# Patient Record
Sex: FEMALE | Race: WHITE | HISPANIC OR LATINO | Employment: PART TIME | URBAN - METROPOLITAN AREA
[De-identification: names, ages, dates, MRNs, and addresses within clinical notes are randomized per-mention and may not be internally consistent; named-entity substitution may affect disease eponyms.]

---
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
## 2019-05-01 LAB
B-HCG UR QL: NEGATIVE
BILIRUB UR QL STRIP: NEGATIVE
CLARITY UR: CLEAR
COLOR UR: ABNORMAL
GLUCOSE UR STRIP-MCNC: NEGATIVE MG/DL
HGB UR QL STRIP.AUTO: NEGATIVE
KETONES UR QL STRIP: ABNORMAL
LEUKOCYTE ESTERASE UR QL STRIP.AUTO: NEGATIVE
NITRITE UR QL STRIP: NEGATIVE
PH UR STRIP.AUTO: 5.5 [PH] (ref 5–8)
PROT UR QL STRIP: NEGATIVE
SP GR UR STRIP: >=1.03 (ref 1–1.03)
UROBILINOGEN UR QL STRIP: ABNORMAL

## 2019-05-01 PROCEDURE — 99284 EMERGENCY DEPT VISIT MOD MDM: CPT

## 2019-05-01 PROCEDURE — 81003 URINALYSIS AUTO W/O SCOPE: CPT

## 2019-05-01 PROCEDURE — 81025 URINE PREGNANCY TEST: CPT

## 2019-05-02 ENCOUNTER — HOSPITAL ENCOUNTER (EMERGENCY)
Facility: HOSPITAL | Age: 19
Discharge: HOME | End: 2019-05-02
Attending: EMERGENCY MEDICINE | Admitting: EMERGENCY MEDICINE

## 2019-05-02 VITALS
DIASTOLIC BLOOD PRESSURE: 78 MMHG | TEMPERATURE: 97.6 F | BODY MASS INDEX: 29.02 KG/M2 | SYSTOLIC BLOOD PRESSURE: 126 MMHG | WEIGHT: 170 LBS | OXYGEN SATURATION: 98 % | HEIGHT: 64 IN | RESPIRATION RATE: 15 BRPM | HEART RATE: 84 BPM

## 2019-05-02 DIAGNOSIS — N89.8 ITCHING IN THE VAGINAL AREA: Primary | ICD-10-CM

## 2019-05-02 LAB
CLUE CELLS SPEC QL WET PREP: NORMAL
HYDATID CYST SPEC WET PREP: NORMAL
KOH PREP NAIL: NORMAL
T VAGINALIS SPEC QL WET PREP: NORMAL
WBC SPEC QL WET PREP: NORMAL
YEAST GENITAL QL WET PREP: NORMAL

## 2019-05-02 PROCEDURE — 87220 TISSUE EXAM FOR FUNGI: CPT | Performed by: EMERGENCY MEDICINE

## 2019-05-02 PROCEDURE — 87591 N.GONORRHOEAE DNA AMP PROB: CPT | Performed by: EMERGENCY MEDICINE

## 2019-05-02 PROCEDURE — 87491 CHLMYD TRACH DNA AMP PROBE: CPT | Performed by: EMERGENCY MEDICINE

## 2019-05-02 PROCEDURE — 87210 SMEAR WET MOUNT SALINE/INK: CPT | Performed by: EMERGENCY MEDICINE

## 2019-05-02 NOTE — DISCHARGE INSTRUCTIONS
You are advised to follow closely with Dr Lewis in 2-3 days for recheck, pelvic culture results, final results of lab work testing, and further testing/treatment as needed.    Drink plenty of fluids      Please return to the emergency department immediately with chest pain different than usual for you, shortness of air, abdominal pain, persistent vomiting/fever, blood in emesis or stool, lightheadedness/fainting, problems with speech, one sided weakness/numbness, new incontinence, problems with vision,or for worsening of symptoms or other concerns.

## 2019-05-02 NOTE — ED NOTES
Pt reports burning on urination with more frequent urination.  Denies abdominal pain.     Leslie Gill, RN  05/01/19 2905

## 2019-05-02 NOTE — ED PROVIDER NOTES
EMERGENCY DEPARTMENT ENCOUNTER    CHIEF COMPLAINT  Chief Complaint: dysuria  History given by: pt  History limited by: none  Room Number: 04/04  PMD: Provider, No Known   Gynecology: Dr. Lewis, [facility]     HPI:  Pt is a 18 y.o. female who presents complaining of dysuria. Pt confirms vaginal itching and sore throat. Pt denies vaginal discharge. Pt's LMP was April 15. Pt is not breastfeeding. Pt had chlamydia and was treated May 2017. Pt denies CP, SOA, cough, N/V, and other complaints at this time.    Duration:  PTA  Onset: sudden  Timing: constant  Location: vaginal  Radiation: none  Quality: burning  Intensity/Severity: moderate  Progression: unchanged  Associated Symptoms: cough  Aggravating Factors: none  Alleviating Factors: none  Previous Episodes: none  Treatment before arrival: none    PAST MEDICAL HISTORY  Active Ambulatory Problems     Diagnosis Date Noted   • Normal pregnancy 03/13/2018   • Mild intermittent asthma 03/13/2018   • Cocaine use complicating pregnancy 03/13/2018   • Iron deficiency anemia 07/18/2018   • Normal labor 10/16/2018   • Normal delivery at term 10/17/2018     Resolved Ambulatory Problems     Diagnosis Date Noted   • Pregnancy 08/02/2018     Past Medical History:   Diagnosis Date   • Asthma    • Chlamydia    • Urinary tract infection    • Yeast infection        PAST SURGICAL HISTORY  History reviewed. No pertinent surgical history.    FAMILY HISTORY  Family History   Problem Relation Age of Onset   • Breast cancer Neg Hx    • Ovarian cancer Neg Hx    • Uterine cancer Neg Hx    • Colon cancer Neg Hx        SOCIAL HISTORY  Social History     Socioeconomic History   • Marital status: Single     Spouse name: Not on file   • Number of children: Not on file   • Years of education: Not on file   • Highest education level: Not on file   Tobacco Use   • Smoking status: Former Smoker   • Smokeless tobacco: Never Used   Substance and Sexual Activity   • Alcohol use: Yes      Comment: Occasionally   • Drug use: No   • Sexual activity: Yes     Partners: Male     Birth control/protection: None       ALLERGIES  Patient has no known allergies.    REVIEW OF SYSTEMS  Review of Systems   Constitutional: Negative for chills and fever.   HENT: Positive for sore throat. Negative for rhinorrhea.    Eyes: Negative for visual disturbance.   Respiratory: Negative for cough and shortness of breath.    Cardiovascular: Negative for chest pain, palpitations and leg swelling.   Gastrointestinal: Negative for abdominal pain, diarrhea and vomiting.   Endocrine: Negative.    Genitourinary: Positive for dysuria. Negative for decreased urine volume, frequency and vaginal discharge.        Vaginal itching   Musculoskeletal: Negative for neck pain.   Skin: Negative for rash.   Neurological: Negative for syncope and headaches.   Psychiatric/Behavioral: Negative.    All other systems reviewed and are negative.      PHYSICAL EXAM  ED Triage Vitals   Temp Heart Rate Resp BP SpO2   05/01/19 2217 05/01/19 2217 05/01/19 2309 05/01/19 2309 05/01/19 2217   97.6 °F (36.4 °C) 91 16 101/63 94 %      Temp src Heart Rate Source Patient Position BP Location FiO2 (%)   05/01/19 2217 05/01/19 2217 05/01/19 2309 05/01/19 2309 --   Tympanic Monitor Sitting Left arm        Physical Exam   Constitutional: She is oriented to person, place, and time.  Non-toxic appearance. She appears distressed (mild).   Non toxic appearing   HENT:   Head: Normocephalic and atraumatic.   Eyes: EOM are normal.   Neck: Normal range of motion. Neck supple.   Cardiovascular: Normal rate, regular rhythm, normal heart sounds and intact distal pulses.   No murmur heard.  Pulses:       Posterior tibial pulses are 2+ on the right side, and 2+ on the left side.   No edema   Pulmonary/Chest: Effort normal and breath sounds normal. No respiratory distress.   Abdominal: Soft. Bowel sounds are normal. There is tenderness (diffuse lower). There is no rebound and no  guarding.   Genitourinary: Uterus normal and vulva normal. Uterus is not enlarged and not tender. Cervix exhibits no motion tenderness and no tenderness. Right adnexum displays no mass and no tenderness. Left adnexum displays no mass and no tenderness. White and vaginal discharge (in vaginal vault) found.   Genitourinary Comments: Female chaperone present; external genitalia within normal limits; cervix closed   Musculoskeletal: Normal range of motion. She exhibits no edema.   Neurological: She is alert and oriented to person, place, and time.   Skin: Skin is warm and dry.   Psychiatric: Affect normal.   Nursing note and vitals reviewed.      LAB RESULTS  Lab Results (last 24 hours)     Procedure Component Value Units Date/Time    Urinalysis With Microscopic If Indicated (No Culture) - Urine, Clean Catch [[id number]]  (Abnormal) Collected:  05/01/19 2343    Specimen:  Urine, Clean Catch Updated:  05/01/19 2353     Color, UA Dark Yellow     Appearance, UA Clear     pH, UA 5.5     Specific Gravity, UA >=1.030     Glucose, UA Negative     Ketones, UA Trace     Bilirubin, UA Negative     Blood, UA Negative     Protein, UA Negative     Leuk Esterase, UA Negative     Nitrite, UA Negative     Urobilinogen, UA 1.0 E.U./dL    Narrative:       Urine microscopic not indicated.    Pregnancy, Urine - Urine, Clean Catch [[id number]]  (Normal) Collected:  05/01/19 2343    Specimen:  Urine, Clean Catch Updated:  05/01/19 2354     HCG, Urine QL Negative    Chlamydia trachomatis, Neisseria gonorrhoeae, PCR - Swab, Cervix [[id number]] Collected:  05/02/19 0159    Specimen:  Swab from Cervix Updated:  05/02/19 0202    LEDA Prep - Swab, Vagina [[id number]] Collected:  05/02/19 0159    Specimen:  Swab from Vagina Updated:  05/02/19 0217     KOH Prep No yeast or hyphal elements seen    Wet Prep, Genital - Swab, Vagina [[id number]]  (Normal) Collected:  05/02/19 0159    Specimen:  Swab from Vagina Updated:  05/02/19 0217     YEAST No yeast seen      HYPHAL ELEMENTS No Hyphal elements seen     WBC'S No WBC's seen     Clue Cells, Wet Prep No Clue cells seen     Trichomonas, Wet Prep No Trichomonas seen          I ordered the above labs and reviewed the results.      PROCEDURES  Procedures      PROGRESS AND CONSULTS      0117. Ordered KOH and chlamydia/gonorrhea for pt workup.     0202. Rechecked pt. Pt resting comfortably. Performed chaperoned pelvic exam for pt workup.       MEDICAL DECISION MAKING  Results were reviewed/discussed with the patient and they were also made aware of online access. Pt also made aware that some labs, such as cultures, will not be resulted during ER visit and follow up with PMD is necessary.     MDM  Number of Diagnoses or Management Options  Itching in the vaginal area:      Amount and/or Complexity of Data Reviewed  Clinical lab tests: ordered and reviewed (No yeast seen.)    Patient Progress  Patient progress: stable         DIAGNOSIS  Final diagnoses:   Itching in the vaginal area       DISPOSITION  DISCHARGE    Patient discharged in stable condition.    Reviewed implications of results, diagnosis, meds, responsibility to follow up, warning signs and symptoms of possible worsening, potential complications and reasons to return to ER.    Patient/Family voiced understanding of above instructions.    Discussed plan for discharge, as there is no emergent indication for admission. Patient referred to primary care provider for BP management due to today's BP. Pt/family is agreeable and understands need for follow up and repeat testing.  Pt is aware that discharge does not mean that nothing is wrong but it indicates no emergency is present that requires admission and they must continue care with follow-up as given below or physician of their choice.     FOLLOW-UP  Evonne Lewis MD  [address] [id number] [phone]    Schedule an appointment as soon as possible for a visit in 3 days  EVEN IF WELL          Medication List      Stop    docusate sodium 100 MG capsule  Commonly known as:  COLACE     DULERA 200-5 MCG/ACT inhaler  Generic drug:  mometasone-formoterol     ferrous sulfate 325 (65 FE) MG tablet     ibuprofen 800 MG tablet  Commonly known as:  ADVIL,MOTRIN     Prenatal Vitamin 27-0.8 MG tablet              Latest Documented Vital Signs:  As of 2:55 AM  BP- 101/63 HR- 87 Temp- 97.6 °F (36.4 °C) (Tympanic) O2 sat- 94%    --  Documentation assistance provided by allegra Miller for Dr. Guzman.  Information recorded by the scribe was done at my direction and has been verified and validated by me.       Yola Miller  05/02/19 0257       Sammie Guzman MD  05/04/19 8053

## 2019-05-04 LAB
C TRACH RRNA SPEC DONR QL NAA+PROBE: NEGATIVE
N GONORRHOEA DNA SPEC QL NAA+PROBE: NEGATIVE

## 2019-05-29 ENCOUNTER — HOSPITAL ENCOUNTER (EMERGENCY)
Facility: HOSPITAL | Age: 19
Discharge: HOME | End: 2019-05-29
Attending: EMERGENCY MEDICINE | Admitting: EMERGENCY MEDICINE

## 2019-05-29 VITALS
DIASTOLIC BLOOD PRESSURE: 71 MMHG | HEIGHT: 64 IN | WEIGHT: 170 LBS | TEMPERATURE: 96 F | BODY MASS INDEX: 29.02 KG/M2 | SYSTOLIC BLOOD PRESSURE: 107 MMHG | OXYGEN SATURATION: 97 % | HEART RATE: 79 BPM | RESPIRATION RATE: 18 BRPM

## 2019-05-29 DIAGNOSIS — J02.9 SORE THROAT: Primary | ICD-10-CM

## 2019-05-29 LAB — S PYO AG THROAT QL: NEGATIVE

## 2019-05-29 PROCEDURE — 94799 UNLISTED PULMONARY SVC/PX: CPT

## 2019-05-29 PROCEDURE — 87880 STREP A ASSAY W/OPTIC: CPT | Performed by: PHYSICIAN ASSISTANT

## 2019-05-29 PROCEDURE — 99284 EMERGENCY DEPT VISIT MOD MDM: CPT

## 2019-05-29 PROCEDURE — 87081 CULTURE SCREEN ONLY: CPT | Performed by: PHYSICIAN ASSISTANT

## 2019-05-29 PROCEDURE — 94640 AIRWAY INHALATION TREATMENT: CPT

## 2019-05-29 RX ORDER — ALBUTEROL SULFATE 90 UG/1
2 AEROSOL, METERED RESPIRATORY (INHALATION) ONCE
Status: DISCONTINUED | OUTPATIENT
Start: 2019-05-29 | End: 2019-05-29

## 2019-05-29 RX ORDER — AMOXICILLIN 500 MG/1
1000 CAPSULE ORAL DAILY
Qty: 14 CAPSULE | Refills: 0 | Status: SHIPPED | OUTPATIENT
Start: 2019-05-29 | End: 2019-06-05

## 2019-05-29 RX ORDER — IPRATROPIUM BROMIDE AND ALBUTEROL SULFATE 2.5; .5 MG/3ML; MG/3ML
3 SOLUTION RESPIRATORY (INHALATION) ONCE
Status: COMPLETED | OUTPATIENT
Start: 2019-05-29 | End: 2019-05-29

## 2019-05-29 RX ORDER — BENZONATATE 100 MG/1
100 CAPSULE ORAL 3 TIMES DAILY PRN
Qty: 21 CAPSULE | Refills: 0 | Status: SHIPPED | OUTPATIENT
Start: 2019-05-29 | End: 2019-06-05

## 2019-05-29 RX ADMIN — IPRATROPIUM BROMIDE AND ALBUTEROL SULFATE 3 ML: 2.5; .5 SOLUTION RESPIRATORY (INHALATION) at 18:34

## 2019-05-31 LAB — BACTERIA SPEC AEROBE CULT: NORMAL

## 2020-01-16 ENCOUNTER — HOSPITAL ENCOUNTER (EMERGENCY)
Facility: HOSPITAL | Age: 20
Discharge: HOME | End: 2020-01-16
Attending: EMERGENCY MEDICINE | Admitting: EMERGENCY MEDICINE

## 2020-01-16 ENCOUNTER — APPOINTMENT (OUTPATIENT)
Dept: GENERAL RADIOLOGY | Facility: HOSPITAL | Age: 20
End: 2020-01-16

## 2020-01-16 VITALS
RESPIRATION RATE: 21 BRPM | DIASTOLIC BLOOD PRESSURE: 77 MMHG | HEART RATE: 104 BPM | TEMPERATURE: 97 F | BODY MASS INDEX: 28.34 KG/M2 | OXYGEN SATURATION: 95 % | SYSTOLIC BLOOD PRESSURE: 108 MMHG | WEIGHT: 166 LBS | HEIGHT: 64 IN

## 2020-01-16 DIAGNOSIS — J45.41 MODERATE PERSISTENT ASTHMA WITH ACUTE EXACERBATION: Primary | ICD-10-CM

## 2020-01-16 DIAGNOSIS — B34.9 VIRAL SYNDROME: ICD-10-CM

## 2020-01-16 LAB
B PARAPERT DNA SPEC QL NAA+PROBE: NOT DETECTED
B PERT DNA SPEC QL NAA+PROBE: NOT DETECTED
C PNEUM DNA NPH QL NAA+NON-PROBE: NOT DETECTED
FLUAV H1 2009 PAND RNA NPH QL NAA+PROBE: NOT DETECTED
FLUAV H1 HA GENE NPH QL NAA+PROBE: NOT DETECTED
FLUAV H3 RNA NPH QL NAA+PROBE: NOT DETECTED
FLUAV SUBTYP SPEC NAA+PROBE: NOT DETECTED
FLUBV RNA ISLT QL NAA+PROBE: NOT DETECTED
HADV DNA SPEC NAA+PROBE: NOT DETECTED
HCOV 229E RNA SPEC QL NAA+PROBE: NOT DETECTED
HCOV HKU1 RNA SPEC QL NAA+PROBE: NOT DETECTED
HCOV NL63 RNA SPEC QL NAA+PROBE: NOT DETECTED
HCOV OC43 RNA SPEC QL NAA+PROBE: NOT DETECTED
HMPV RNA NPH QL NAA+NON-PROBE: NOT DETECTED
HPIV1 RNA SPEC QL NAA+PROBE: NOT DETECTED
HPIV2 RNA SPEC QL NAA+PROBE: NOT DETECTED
HPIV3 RNA NPH QL NAA+PROBE: NOT DETECTED
HPIV4 P GENE NPH QL NAA+PROBE: NOT DETECTED
M PNEUMO IGG SER IA-ACNC: NOT DETECTED
RHINOVIRUS RNA SPEC NAA+PROBE: DETECTED
RSV RNA NPH QL NAA+NON-PROBE: NOT DETECTED

## 2020-01-16 PROCEDURE — 99283 EMERGENCY DEPT VISIT LOW MDM: CPT

## 2020-01-16 PROCEDURE — 94640 AIRWAY INHALATION TREATMENT: CPT

## 2020-01-16 PROCEDURE — 63710000001 DEXAMETHASONE PER 0.25 MG: Performed by: PHYSICIAN ASSISTANT

## 2020-01-16 PROCEDURE — 0100U HC BIOFIRE FILMARRAY RESP PANEL 2: CPT | Performed by: PHYSICIAN ASSISTANT

## 2020-01-16 RX ORDER — ASPIRIN 325 MG
325 TABLET ORAL ONCE
Status: DISCONTINUED | OUTPATIENT
Start: 2020-01-16 | End: 2020-01-16

## 2020-01-16 RX ORDER — SODIUM CHLORIDE 0.9 % (FLUSH) 0.9 %
10 SYRINGE (ML) INJECTION AS NEEDED
Status: DISCONTINUED | OUTPATIENT
Start: 2020-01-16 | End: 2020-01-16

## 2020-01-16 RX ORDER — ALBUTEROL SULFATE 90 UG/1
2 AEROSOL, METERED RESPIRATORY (INHALATION) ONCE
Status: COMPLETED | OUTPATIENT
Start: 2020-01-16 | End: 2020-01-16

## 2020-01-16 RX ORDER — OSELTAMIVIR PHOSPHATE 75 MG/1
75 CAPSULE ORAL 2 TIMES DAILY
Qty: 10 CAPSULE | Refills: 0 | Status: SHIPPED | OUTPATIENT
Start: 2020-01-16 | End: 2020-01-21

## 2020-01-16 RX ORDER — INHALER, ASSIST DEVICES
SPACER (EA) MISCELLANEOUS
Qty: 1 EACH | Refills: 0 | Status: SHIPPED | OUTPATIENT
Start: 2020-01-16 | End: 2021-01-15

## 2020-01-16 RX ORDER — IPRATROPIUM BROMIDE AND ALBUTEROL SULFATE 2.5; .5 MG/3ML; MG/3ML
3 SOLUTION RESPIRATORY (INHALATION) ONCE
Status: DISCONTINUED | OUTPATIENT
Start: 2020-01-16 | End: 2020-01-16

## 2020-01-16 RX ORDER — ALBUTEROL SULFATE 2.5 MG/3ML
2.5 SOLUTION RESPIRATORY (INHALATION) ONCE
Status: COMPLETED | OUTPATIENT
Start: 2020-01-16 | End: 2020-01-16

## 2020-01-16 RX ORDER — ALBUTEROL SULFATE 90 UG/1
2 AEROSOL, METERED RESPIRATORY (INHALATION) EVERY 6 HOURS PRN
Qty: 1 INHALER | Refills: 0 | Status: SHIPPED | OUTPATIENT
Start: 2020-01-16 | End: 2020-02-02 | Stop reason: SDUPTHER

## 2020-01-16 RX ADMIN — ALBUTEROL SULFATE 2.5 MG: 2.5 SOLUTION RESPIRATORY (INHALATION) at 21:34

## 2020-01-16 RX ADMIN — DEXAMETHASONE 6 MG: 4 TABLET ORAL at 21:23

## 2020-01-16 RX ADMIN — ALBUTEROL SULFATE 2 PUFF: 90 AEROSOL, METERED RESPIRATORY (INHALATION) at 20:47

## 2020-01-17 NOTE — ED PROVIDER NOTES
MD ATTESTATION NOTE    The ELIZABETH and I have discussed this patient's history, physical exam, and treatment plan.  I have reviewed the documentation and personally had a face to face interaction with the patient. I affirm the documentation and agree with the treatment and plan.  The attached note describes my personal findings.      Yola Dillard is a 19 y.o. female who presents to the ED c/o cough and shortness of breath.  Symptoms initially started 1 month ago.  Worsened over the past 2 weeks.  She has been in the emergency department multiple times recently.  She denies having any chest pain.  She states that she does not want to get a chest x-ray because she had 1 2 weeks ago at [facility].      On exam:  Expiratory wheezing bilaterally  Normal respiratory effort  Speaks in full sentences  Regular rate and rhythm    Labs  Recent Results (from the past 24 hour(s))   Respiratory Panel, PCR - Swab, Nasopharynx    Collection Time: 01/16/20  8:37 PM   Result Value Ref Range    ADENOVIRUS, PCR Not Detected Not Detected    Coronavirus 229E Not Detected Not Detected    Coronavirus HKU1 Not Detected Not Detected    Coronavirus NL63 Not Detected Not Detected    Coronavirus OC43 Not Detected Not Detected    Human Metapneumovirus Not Detected Not Detected    Human Rhinovirus/Enterovirus Detected (A) Not Detected    Influenza B PCR Not Detected Not Detected    Parainfluenza Virus 1 Not Detected Not Detected    Parainfluenza Virus 2 Not Detected Not Detected    Parainfluenza Virus 3 Not Detected Not Detected    Parainfluenza Virus 4 Not Detected Not Detected    Bordetella pertussis pcr Not Detected Not Detected    Influenza A H1 2009 PCR Not Detected Not Detected    Chlamydophila pneumoniae PCR Not Detected Not Detected    Mycoplasma pneumo by PCR Not Detected Not Detected    Influenza A PCR Not Detected Not Detected    Influenza A H3 Not Detected Not Detected    Influenza A H1 Not Detected Not Detected     RSV, PCR Not Detected Not Detected    Bordetella parapertussis PCR Not Detected Not Detected       Radiology  No Radiology Exams Resulted Within Past 24 Hours    Medical Decision Making:       Patient given DuoNeb in the emergency department.  Labs show rhinovirus.  Discharge home with steroids and albuterol.    Diagnosis  Final diagnoses:   Moderate persistent asthma with acute exacerbation        Ryan Ford II, MD  01/17/20 0007

## 2020-01-17 NOTE — ED TRIAGE NOTES
Pt reports chest tightness and difficulty getting a good breath in since last night. Pt reports a hx of asthma and because she does not have insurance right now she hasn't been able to get a prescription for a new rescue inhaler.

## 2020-01-17 NOTE — ED PROVIDER NOTES
EMERGENCY DEPARTMENT ENCOUNTER    Room Number:  35/35  Date seen:  1/17/2020  PCP: Provider, No Known  Historian: Patient      HPI:  Chief complaint: SOA  Context: Yola Dillard is a 19 y.o. female who presents to the ED c/o SOA that began PTA. Denies fever and chills. Pt states she does not have a Rescue inhaler due to insurance, now has approved insurance and is unsure of when it kicks in. She notes this feels like her previous asthma attacks. Pt states her son was just Dx with influenza last night and would like a flu test.   She denies any chest pain but states her chest feels a little tight.  This tightness does not radiate and is said to be worse with coughing.          ALLERGIES  Patient has no known allergies.    PAST MEDICAL HISTORY  Active Ambulatory Problems     Diagnosis Date Noted   • Normal pregnancy 03/13/2018   • Mild intermittent asthma 03/13/2018   • Cocaine use complicating pregnancy 03/13/2018   • Iron deficiency anemia 07/18/2018   • Normal labor 10/16/2018   • Normal delivery at term 10/17/2018     Resolved Ambulatory Problems     Diagnosis Date Noted   • Pregnancy 08/02/2018     Past Medical History:   Diagnosis Date   • Asthma    • Chlamydia    • Urinary tract infection    • Yeast infection        PAST SURGICAL HISTORY  No past surgical history on file.    FAMILY HISTORY  Family History   Problem Relation Age of Onset   • Breast cancer Neg Hx    • Ovarian cancer Neg Hx    • Uterine cancer Neg Hx    • Colon cancer Neg Hx        SOCIAL HISTORY  Social History     Socioeconomic History   • Marital status: Single     Spouse name: Not on file   • Number of children: Not on file   • Years of education: Not on file   • Highest education level: Not on file   Tobacco Use   • Smoking status: Former Smoker   • Smokeless tobacco: Never Used   Substance and Sexual Activity   • Alcohol use: Yes     Comment: Occasionally   • Drug use: No   • Sexual activity: Yes     Partners: Male     Birth  control/protection: None           REVIEW OF SYSTEMS  Review of Systems   Constitutional: Negative for chills and fever.   HENT: Negative for sore throat.    Eyes: Negative.    Respiratory: Positive for shortness of breath. Negative for cough.    Cardiovascular: Negative for chest pain.   Gastrointestinal: Negative for abdominal pain, diarrhea and vomiting.   Genitourinary: Negative for dysuria.   Musculoskeletal: Negative for neck pain.   Skin: Negative for rash.   Allergic/Immunologic: Negative.    Neurological: Negative for weakness, numbness and headaches.   Hematological: Negative.    Psychiatric/Behavioral: Negative.    All other systems reviewed and are negative.          PHYSICAL EXAM  ED Triage Vitals [01/16/20 1921]   Temp Heart Rate Resp BP SpO2   97 °F (36.1 °C) 104 21 -- 94 %      Temp src Heart Rate Source Patient Position BP Location FiO2 (%)   Tympanic Monitor -- -- --     Physical Exam   Constitutional: She is oriented to person, place, and time. No distress.   HENT:   Head: Normocephalic and atraumatic.   Eyes: Pupils are equal, round, and reactive to light. EOM are normal.   Neck: Normal range of motion. Neck supple.   Cardiovascular: Normal rate, regular rhythm and normal heart sounds.   Pulmonary/Chest: Effort normal. No respiratory distress. She has no decreased breath sounds. She has wheezes (mild diffuse inspiratory and expiratory).   Pt has good air movement   Abdominal: Soft. There is no tenderness. There is no rebound and no guarding.   Musculoskeletal: Normal range of motion. She exhibits no edema.   Neurological: She is alert and oriented to person, place, and time. She has normal sensation and normal strength.   Skin: Skin is warm and dry. No rash noted.   Psychiatric: Mood and affect normal.   Nursing note and vitals reviewed.        LAB RESULTS  Recent Results (from the past 24 hour(s))   Respiratory Panel, PCR - Swab, Nasopharynx    Collection Time: 01/16/20  8:37 PM   Result Value  Ref Range    ADENOVIRUS, PCR Not Detected Not Detected    Coronavirus 229E Not Detected Not Detected    Coronavirus HKU1 Not Detected Not Detected    Coronavirus NL63 Not Detected Not Detected    Coronavirus OC43 Not Detected Not Detected    Human Metapneumovirus Not Detected Not Detected    Human Rhinovirus/Enterovirus Detected (A) Not Detected    Influenza B PCR Not Detected Not Detected    Parainfluenza Virus 1 Not Detected Not Detected    Parainfluenza Virus 2 Not Detected Not Detected    Parainfluenza Virus 3 Not Detected Not Detected    Parainfluenza Virus 4 Not Detected Not Detected    Bordetella pertussis pcr Not Detected Not Detected    Influenza A H1 2009 PCR Not Detected Not Detected    Chlamydophila pneumoniae PCR Not Detected Not Detected    Mycoplasma pneumo by PCR Not Detected Not Detected    Influenza A PCR Not Detected Not Detected    Influenza A H3 Not Detected Not Detected    Influenza A H1 Not Detected Not Detected    RSV, PCR Not Detected Not Detected    Bordetella parapertussis PCR Not Detected Not Detected       I ordered the above labs and reviewed the results        MEDICATIONS GIVEN IN ER  Medications   dexamethasone (DECADRON) tablet 6 mg (6 mg Oral Given 1/16/20 2123)   albuterol (PROVENTIL) nebulizer solution 0.083% 2.5 mg/3mL (2.5 mg Nebulization Given 1/16/20 2134)   albuterol sulfate HFA (PROVENTIL HFA;VENTOLIN HFA;PROAIR HFA) inhaler 2 puff (2 puffs Inhalation Given 1/16/20 2047)           PROCEDURES  Procedures        COURSE & MEDICAL DECISION MAKING  Pertinent Labs and Imaging studies that were ordered and reviewed are noted above.  Results were reviewed/discussed with the patient and they were also made aware of online access.  Pt also made aware that some labs, such as cultures, will not be resulted during ER visit and follow up with PMD is necessary.         PROGRESS AND CONSULTS     2022- Initial encounter. The patient is resting comfortably and in NAD. D/w pt the risk and  "benefits of blood work as well as imaging in which she declines at this time since she has been to the ED within the past month and feels this is her Asthma. Discussed the plan for flu test. Pt understands and agrees with the plan, all questions answered.    2028- Reviewed pt's history and workup with Dr. Ford (ER physician).  After a bedside evaluation, Dr. Ford agrees with the plan of care.    2129- Rechecked pt who is resting comfortably in NAD. Upon re-exam, the pt lung sounds have improved post breathing Tx. D/w pt the plan for DC with prescription for an inhaler and Tamiflu to treat pophylactically d/t child at home with the flu. Pt understands and agrees with plan. All questions answered.            Disposition vitals:  /77   Pulse 104   Temp 97 °F (36.1 °C) (Tympanic)   Resp 21   Ht 162.6 cm (64\")   Wt 75.3 kg (166 lb)   LMP 01/16/2020   SpO2 95%   BMI 28.49 kg/m²         DIAGNOSIS  Final diagnoses:   Moderate persistent asthma with acute exacerbation   Viral syndrome         DISPOSITION  DISCHARGE    Patient discharged in stable condition.    Reviewed implications of results, diagnosis, meds, responsibility to follow up, warning signs and symptoms of possible worsening, potential complications and reasons to return to ER.    Patient/Family voiced understanding of above instructions.    Discussed plan for discharge, as there is no emergent indication for admission. Patient referred to primary care provider for BP management due to today's BP. Pt/family is agreeable and understands need for follow up and repeat testing.  Pt is aware that discharge does not mean that nothing is wrong but it indicates no emergency is present that requires admission and they must continue care with follow-up as given below or physician of their choice.     FOLLOW-UP  PATIENT LIAISON [facility] [address] [phone]  Schedule an appointment as soon as possible for a visit   For further " evaluation and treatment    [facility]  [address] [address]  [phone]  Schedule an appointment as soon as possible for a visit   For further evaluation and treatment         Medication List      New Prescriptions    AEROCHAMBER MV inhaler  Use as instructed     oseltamivir 75 MG capsule  Commonly known as:  TAMIFLU  Take 1 capsule by mouth 2 (Two) Times a Day for 5 days.        Changed    albuterol sulfate  (90 Base) MCG/ACT inhaler  Commonly known as:  PROVENTIL HFA;VENTOLIN HFA;PROAIR HFA  Inhale 2 puffs Every 6 (Six) Hours As Needed for Wheezing or Shortness of   Air.  What changed:    when to take this  reasons to take this  additional instructions              Documentation assistance provided by allegra Hanna for Gavin Raza PA-C.  Information recorded by the allegra was done at my direction and has been verified and validated by me.        Mary Hanna  01/17/20 0308       Jaime Raza III, PA  01/17/20 8981

## 2020-01-30 ENCOUNTER — HOSPITAL ENCOUNTER (EMERGENCY)
Facility: HOSPITAL | Age: 20
Discharge: HOME | End: 2020-01-31
Attending: EMERGENCY MEDICINE | Admitting: EMERGENCY MEDICINE

## 2020-01-30 ENCOUNTER — APPOINTMENT (OUTPATIENT)
Dept: GENERAL RADIOLOGY | Facility: HOSPITAL | Age: 20
End: 2020-01-30

## 2020-01-30 DIAGNOSIS — J45.901 MODERATE ASTHMA WITH EXACERBATION, UNSPECIFIED WHETHER PERSISTENT: ICD-10-CM

## 2020-01-30 DIAGNOSIS — J06.9 UPPER RESPIRATORY TRACT INFECTION, UNSPECIFIED TYPE: Primary | ICD-10-CM

## 2020-01-30 PROCEDURE — 96374 THER/PROPH/DIAG INJ IV PUSH: CPT

## 2020-01-30 PROCEDURE — 99283 EMERGENCY DEPT VISIT LOW MDM: CPT

## 2020-01-30 PROCEDURE — 94640 AIRWAY INHALATION TREATMENT: CPT

## 2020-01-30 RX ORDER — SODIUM CHLORIDE 0.9 % (FLUSH) 0.9 %
10 SYRINGE (ML) INJECTION AS NEEDED
Status: DISCONTINUED | OUTPATIENT
Start: 2020-01-30 | End: 2020-01-31 | Stop reason: HOSPADM

## 2020-01-30 RX ORDER — METHYLPREDNISOLONE SODIUM SUCCINATE 125 MG/2ML
125 INJECTION, POWDER, LYOPHILIZED, FOR SOLUTION INTRAMUSCULAR; INTRAVENOUS ONCE
Status: COMPLETED | OUTPATIENT
Start: 2020-01-30 | End: 2020-01-31

## 2020-01-30 RX ORDER — ALBUTEROL SULFATE 2.5 MG/3ML
2.5 SOLUTION RESPIRATORY (INHALATION) ONCE
Status: COMPLETED | OUTPATIENT
Start: 2020-01-30 | End: 2020-01-30

## 2020-01-30 RX ADMIN — ALBUTEROL SULFATE 2.5 MG: 2.5 SOLUTION RESPIRATORY (INHALATION) at 23:56

## 2020-01-31 ENCOUNTER — APPOINTMENT (OUTPATIENT)
Dept: GENERAL RADIOLOGY | Facility: HOSPITAL | Age: 20
End: 2020-01-31

## 2020-01-31 VITALS
HEIGHT: 64 IN | TEMPERATURE: 98.9 F | DIASTOLIC BLOOD PRESSURE: 80 MMHG | WEIGHT: 166.7 LBS | BODY MASS INDEX: 28.46 KG/M2 | OXYGEN SATURATION: 98 % | HEART RATE: 94 BPM | RESPIRATION RATE: 16 BRPM | SYSTOLIC BLOOD PRESSURE: 134 MMHG

## 2020-01-31 LAB
ALBUMIN SERPL-MCNC: 4.2 G/DL (ref 3.5–5.2)
ALBUMIN/GLOB SERPL: 1.4 G/DL
ALP SERPL-CCNC: 107 U/L (ref 39–117)
ALT SERPL W P-5'-P-CCNC: 8 U/L (ref 1–33)
ANION GAP SERPL CALCULATED.3IONS-SCNC: 12.2 MMOL/L (ref 5–15)
AST SERPL-CCNC: 15 U/L (ref 1–32)
BASOPHILS # BLD AUTO: 0.1 10*3/MM3 (ref 0–0.2)
BASOPHILS NFR BLD AUTO: 1 % (ref 0–1.5)
BILIRUB SERPL-MCNC: 0.2 MG/DL (ref 0.2–1.2)
BUN BLD-MCNC: 14 MG/DL (ref 6–20)
BUN/CREAT SERPL: 21.9 (ref 7–25)
CALCIUM SPEC-SCNC: 9.6 MG/DL (ref 8.6–10.5)
CHLORIDE SERPL-SCNC: 101 MMOL/L (ref 98–107)
CO2 SERPL-SCNC: 25.8 MMOL/L (ref 22–29)
CREAT BLD-MCNC: 0.64 MG/DL (ref 0.57–1)
DEPRECATED RDW RBC AUTO: 49 FL (ref 37–54)
EOSINOPHIL # BLD AUTO: 1.67 10*3/MM3 (ref 0–0.4)
EOSINOPHIL NFR BLD AUTO: 16.5 % (ref 0.3–6.2)
ERYTHROCYTE [DISTWIDTH] IN BLOOD BY AUTOMATED COUNT: 15.1 % (ref 12.3–15.4)
FLUAV AG NPH QL: NEGATIVE
FLUBV AG NPH QL IA: NEGATIVE
GFR SERPL CREATININE-BSD FRML MDRD: 118 ML/MIN/1.73
GLOBULIN UR ELPH-MCNC: 2.9 GM/DL
GLUCOSE BLD-MCNC: 85 MG/DL (ref 65–99)
HCG SERPL QL: NEGATIVE
HCT VFR BLD AUTO: 41.4 % (ref 34–46.6)
HGB BLD-MCNC: 13.2 G/DL (ref 12–15.9)
IMM GRANULOCYTES # BLD AUTO: 0.03 10*3/MM3 (ref 0–0.05)
IMM GRANULOCYTES NFR BLD AUTO: 0.3 % (ref 0–0.5)
LYMPHOCYTES # BLD AUTO: 3.08 10*3/MM3 (ref 0.7–3.1)
LYMPHOCYTES NFR BLD AUTO: 30.4 % (ref 19.6–45.3)
MCH RBC QN AUTO: 27.8 PG (ref 26.6–33)
MCHC RBC AUTO-ENTMCNC: 31.9 G/DL (ref 31.5–35.7)
MCV RBC AUTO: 87.2 FL (ref 79–97)
MONOCYTES # BLD AUTO: 0.58 10*3/MM3 (ref 0.1–0.9)
MONOCYTES NFR BLD AUTO: 5.7 % (ref 5–12)
NEUTROPHILS # BLD AUTO: 4.68 10*3/MM3 (ref 1.7–7)
NEUTROPHILS NFR BLD AUTO: 46.1 % (ref 42.7–76)
NRBC BLD AUTO-RTO: 0 /100 WBC (ref 0–0.2)
PLATELET # BLD AUTO: 349 10*3/MM3 (ref 140–450)
PMV BLD AUTO: 9.8 FL (ref 6–12)
POTASSIUM BLD-SCNC: 3.7 MMOL/L (ref 3.5–5.2)
PROT SERPL-MCNC: 7.1 G/DL (ref 6–8.5)
RBC # BLD AUTO: 4.75 10*6/MM3 (ref 3.77–5.28)
SODIUM BLD-SCNC: 139 MMOL/L (ref 136–145)
WBC NRBC COR # BLD: 10.14 10*3/MM3 (ref 3.4–10.8)

## 2020-01-31 PROCEDURE — 25010000002 METHYLPREDNISOLONE PER 125 MG: Performed by: EMERGENCY MEDICINE

## 2020-01-31 PROCEDURE — 85025 COMPLETE CBC W/AUTO DIFF WBC: CPT | Performed by: EMERGENCY MEDICINE

## 2020-01-31 PROCEDURE — 84703 CHORIONIC GONADOTROPIN ASSAY: CPT | Performed by: EMERGENCY MEDICINE

## 2020-01-31 PROCEDURE — 80053 COMPREHEN METABOLIC PANEL: CPT | Performed by: EMERGENCY MEDICINE

## 2020-01-31 PROCEDURE — 87804 INFLUENZA ASSAY W/OPTIC: CPT | Performed by: EMERGENCY MEDICINE

## 2020-01-31 PROCEDURE — 71046 X-RAY EXAM CHEST 2 VIEWS: CPT

## 2020-01-31 RX ORDER — METHYLPREDNISOLONE 4 MG/1
TABLET ORAL
Qty: 21 TABLET | Refills: 0 | Status: SHIPPED | OUTPATIENT
Start: 2020-01-31

## 2020-01-31 RX ADMIN — METHYLPREDNISOLONE SODIUM SUCCINATE 125 MG: 125 INJECTION, POWDER, FOR SOLUTION INTRAMUSCULAR; INTRAVENOUS at 00:27

## 2020-02-02 ENCOUNTER — APPOINTMENT (OUTPATIENT)
Dept: GENERAL RADIOLOGY | Facility: HOSPITAL | Age: 20
End: 2020-02-02

## 2020-02-02 ENCOUNTER — HOSPITAL ENCOUNTER (EMERGENCY)
Facility: HOSPITAL | Age: 20
Discharge: HOME | End: 2020-02-02
Attending: EMERGENCY MEDICINE | Admitting: EMERGENCY MEDICINE

## 2020-02-02 VITALS
SYSTOLIC BLOOD PRESSURE: 102 MMHG | DIASTOLIC BLOOD PRESSURE: 64 MMHG | RESPIRATION RATE: 18 BRPM | HEART RATE: 78 BPM | TEMPERATURE: 96.3 F | HEIGHT: 64 IN | WEIGHT: 170 LBS | OXYGEN SATURATION: 95 % | BODY MASS INDEX: 29.02 KG/M2

## 2020-02-02 DIAGNOSIS — J45.901 EXACERBATION OF ASTHMA, UNSPECIFIED ASTHMA SEVERITY, UNSPECIFIED WHETHER PERSISTENT: Primary | ICD-10-CM

## 2020-02-02 LAB
ALBUMIN SERPL-MCNC: 3.5 G/DL (ref 3.5–5.2)
ALBUMIN/GLOB SERPL: 1.7 G/DL
ALP SERPL-CCNC: 85 U/L (ref 39–117)
ALT SERPL W P-5'-P-CCNC: 11 U/L (ref 1–33)
ANION GAP SERPL CALCULATED.3IONS-SCNC: 12.5 MMOL/L (ref 5–15)
AST SERPL-CCNC: 14 U/L (ref 1–32)
BASOPHILS # BLD AUTO: 0.07 10*3/MM3 (ref 0–0.2)
BASOPHILS NFR BLD AUTO: 0.8 % (ref 0–1.5)
BILIRUB SERPL-MCNC: 0.4 MG/DL (ref 0.2–1.2)
BUN BLD-MCNC: 9 MG/DL (ref 6–20)
BUN/CREAT SERPL: 14.5 (ref 7–25)
CALCIUM SPEC-SCNC: 8.2 MG/DL (ref 8.6–10.5)
CHLORIDE SERPL-SCNC: 105 MMOL/L (ref 98–107)
CO2 SERPL-SCNC: 23.5 MMOL/L (ref 22–29)
CREAT BLD-MCNC: 0.62 MG/DL (ref 0.57–1)
DEPRECATED RDW RBC AUTO: 46.3 FL (ref 37–54)
EOSINOPHIL # BLD AUTO: 1.32 10*3/MM3 (ref 0–0.4)
EOSINOPHIL NFR BLD AUTO: 14.3 % (ref 0.3–6.2)
ERYTHROCYTE [DISTWIDTH] IN BLOOD BY AUTOMATED COUNT: 15 % (ref 12.3–15.4)
GFR SERPL CREATININE-BSD FRML MDRD: 123 ML/MIN/1.73
GLOBULIN UR ELPH-MCNC: 2.1 GM/DL
GLUCOSE BLD-MCNC: 91 MG/DL (ref 65–99)
HCT VFR BLD AUTO: 36.8 % (ref 34–46.6)
HGB BLD-MCNC: 11.8 G/DL (ref 12–15.9)
IMM GRANULOCYTES # BLD AUTO: 0.03 10*3/MM3 (ref 0–0.05)
IMM GRANULOCYTES NFR BLD AUTO: 0.3 % (ref 0–0.5)
LYMPHOCYTES # BLD AUTO: 2.72 10*3/MM3 (ref 0.7–3.1)
LYMPHOCYTES NFR BLD AUTO: 29.4 % (ref 19.6–45.3)
MCH RBC QN AUTO: 27.1 PG (ref 26.6–33)
MCHC RBC AUTO-ENTMCNC: 32.1 G/DL (ref 31.5–35.7)
MCV RBC AUTO: 84.6 FL (ref 79–97)
MONOCYTES # BLD AUTO: 0.62 10*3/MM3 (ref 0.1–0.9)
MONOCYTES NFR BLD AUTO: 6.7 % (ref 5–12)
NEUTROPHILS # BLD AUTO: 4.49 10*3/MM3 (ref 1.7–7)
NEUTROPHILS NFR BLD AUTO: 48.5 % (ref 42.7–76)
NRBC BLD AUTO-RTO: 0 /100 WBC (ref 0–0.2)
PLATELET # BLD AUTO: 327 10*3/MM3 (ref 140–450)
PMV BLD AUTO: 9.7 FL (ref 6–12)
POTASSIUM BLD-SCNC: 3.6 MMOL/L (ref 3.5–5.2)
PROT SERPL-MCNC: 5.6 G/DL (ref 6–8.5)
RBC # BLD AUTO: 4.35 10*6/MM3 (ref 3.77–5.28)
SODIUM BLD-SCNC: 141 MMOL/L (ref 136–145)
WBC NRBC COR # BLD: 9.25 10*3/MM3 (ref 3.4–10.8)

## 2020-02-02 PROCEDURE — 85025 COMPLETE CBC W/AUTO DIFF WBC: CPT | Performed by: PHYSICIAN ASSISTANT

## 2020-02-02 PROCEDURE — 25010000002 METHYLPREDNISOLONE PER 125 MG: Performed by: PHYSICIAN ASSISTANT

## 2020-02-02 PROCEDURE — 96374 THER/PROPH/DIAG INJ IV PUSH: CPT

## 2020-02-02 PROCEDURE — 71046 X-RAY EXAM CHEST 2 VIEWS: CPT

## 2020-02-02 PROCEDURE — 93010 ELECTROCARDIOGRAM REPORT: CPT | Performed by: INTERNAL MEDICINE

## 2020-02-02 PROCEDURE — 93005 ELECTROCARDIOGRAM TRACING: CPT | Performed by: PHYSICIAN ASSISTANT

## 2020-02-02 PROCEDURE — 99283 EMERGENCY DEPT VISIT LOW MDM: CPT

## 2020-02-02 PROCEDURE — 80053 COMPREHEN METABOLIC PANEL: CPT | Performed by: PHYSICIAN ASSISTANT

## 2020-02-02 PROCEDURE — 94799 UNLISTED PULMONARY SVC/PX: CPT

## 2020-02-02 PROCEDURE — 94640 AIRWAY INHALATION TREATMENT: CPT

## 2020-02-02 RX ORDER — IPRATROPIUM BROMIDE AND ALBUTEROL SULFATE 2.5; .5 MG/3ML; MG/3ML
3 SOLUTION RESPIRATORY (INHALATION) ONCE
Status: COMPLETED | OUTPATIENT
Start: 2020-02-02 | End: 2020-02-02

## 2020-02-02 RX ORDER — METHYLPREDNISOLONE SODIUM SUCCINATE 125 MG/2ML
125 INJECTION, POWDER, LYOPHILIZED, FOR SOLUTION INTRAMUSCULAR; INTRAVENOUS ONCE
Status: COMPLETED | OUTPATIENT
Start: 2020-02-02 | End: 2020-02-02

## 2020-02-02 RX ORDER — ALBUTEROL SULFATE 90 UG/1
2 AEROSOL, METERED RESPIRATORY (INHALATION) EVERY 4 HOURS PRN
Qty: 1 INHALER | Refills: 0 | Status: SHIPPED | OUTPATIENT
Start: 2020-02-02

## 2020-02-02 RX ADMIN — METHYLPREDNISOLONE SODIUM SUCCINATE 125 MG: 125 INJECTION, POWDER, FOR SOLUTION INTRAMUSCULAR; INTRAVENOUS at 11:06

## 2020-02-02 RX ADMIN — IPRATROPIUM BROMIDE AND ALBUTEROL SULFATE 3 ML: 2.5; .5 SOLUTION RESPIRATORY (INHALATION) at 10:47

## 2020-02-02 NOTE — DISCHARGE INSTRUCTIONS
Use your albuterol inhaler every 4 hours for the next 24 hours, then use every 4 hours only as needed for wheezing or shortness of breath.  Continue the previously prescribed steroid course, follow-up with your primary care provider this week for recheck, return to the emergency department for worsening symptoms as needed.

## 2020-02-02 NOTE — ED NOTES
Pt c/o cough and soa that started 2-3months ago. Pt reports waking up coughing this am and progressively became worse. Pt has not been seen by provider in the two months. Denies fever, n/v. Pt has audible wheezing but no acute distress     Kaur Snow, RN  02/02/20 8910

## 2020-02-02 NOTE — ED TRIAGE NOTES
"Has been soa and wheezing c pain in her chest since this am.  It has been \"an issue\" x 1 month  "

## 2020-02-02 NOTE — ED PROVIDER NOTES
Pt presents to the ED c/o  intermittent cough and shortness of breath for 1 week, comes in today for evaluation for her persistent cough, denied fever, chest pains, dizziness.  Does have a history of asthma but is currently out of her albuterol inhaler.  I have evaluated her after she received a breathing treatment in the emergency department, she states that she feels significantly better at this time.     On exam,   General: Awake, alert, no acute distress, nondiaphoretic  HEENT: Mucous membranes moist, atraumatic, normocephalic, EOMI  Neck: Full ROM  Pulm: Symmetric, non-labored, lungs CTAB   Cardiovascular: Regular rate and rhythm, normal S1/S2, intact distal pulses  GI: Soft, non-tender, non-distended, no rebound, no guarding, bowel sounds present  MSK: Full ROM, no deformity  Skin: Warm, dry  Neuro: Alert and oriented x 3, GCS 15, moving all extremities, no focal deficits  Psych: Calm, cooperative    EKG    EKG Time: 1105  Rhythm/Rate: Sinus rhythm with rate of 79  Nml axis  Nml intervals   No acute ischemic changes  No STEMI     Interpreted Contemporaneously by me, independently viewed  No prior for comparison      Plan:  Patient feeling significant better at this time, has had multiple recent ER visits recently for asthma and/or cocaine use.  Plan at this point is for continuing of a previously prescribed Medrol Dosepak, I will prescribe her albuterol inhaler to be used every 4 hours for the next 24 hours, then to be used every 4 hours as needed for wheezing or shortness of breath.  Advise close PCP follow-up this week for recheck, and return to the emergency department for worsening symptoms as needed.       Attestation:  The ELIZABETH and I have discussed this patient's history, physical exam, and treatment plan.  I have reviewed the documentation and personally had a face to face interaction with the patient. I affirm the documentation and agree with the treatment and plan.  The attached note describes my  personal findings.            Jigar Harp MD  02/02/20 4980

## 2020-02-02 NOTE — ED PROVIDER NOTES
EMERGENCY DEPARTMENT ENCOUNTER    Room Number:  32/32  PCP: Provider, No Known  Historian: Patient      HPI:  Chief Complaint: Cough  Context: Yola Dillard is a 19 y.o. female who presents to the ED c/o intermittent cough and SOA for 1 week. Comes in today due to persistent cough. Denies fever, chest pain, or recent sick contacts    Hx of asthma but recently ran out of her inhaler          MEDICAL RECORD REVIEW    Reviewed old records. Pt has multiple ED visits here at [facility] and [place]. Last seen at [facility] 2 days ago for same symptoms. At that time, pt was d/c with steroids       ALLERGIES  Patient has no known allergies.    PAST MEDICAL HISTORY  Active Ambulatory Problems     Diagnosis Date Noted   • Normal pregnancy 03/13/2018   • Mild intermittent asthma 03/13/2018   • Cocaine use complicating pregnancy 03/13/2018   • Iron deficiency anemia 07/18/2018   • Normal labor 10/16/2018   • Normal delivery at term 10/17/2018     Resolved Ambulatory Problems     Diagnosis Date Noted   • Pregnancy 08/02/2018     Past Medical History:   Diagnosis Date   • Asthma    • Chlamydia    • Depression    • Urinary tract infection    • Yeast infection        PAST SURGICAL HISTORY  History reviewed. No pertinent surgical history.    FAMILY HISTORY  Family History   Problem Relation Age of Onset   • Breast cancer Neg Hx    • Ovarian cancer Neg Hx    • Uterine cancer Neg Hx    • Colon cancer Neg Hx        SOCIAL HISTORY  Social History     Socioeconomic History   • Marital status: Single     Spouse name: Not on file   • Number of children: Not on file   • Years of education: Not on file   • Highest education level: Not on file   Tobacco Use   • Smoking status: Former Smoker   • Smokeless tobacco: Never Used   Substance and Sexual Activity   • Alcohol use: Yes     Comment: Occasionally   • Drug use: Yes     Types: Marijuana, Cocaine     Comment: cocaine last use yesterday-snorts   • Sexual activity: Yes     Partners: Male     Birth  control/protection: None           REVIEW OF SYSTEMS  Review of Systems   Constitutional: Negative for fatigue.   HENT: Negative for congestion.    Respiratory: Positive for shortness of breath.    Cardiovascular: Negative for chest pain and leg swelling.   Gastrointestinal: Negative for vomiting.   Endocrine: Negative for polyuria.   Skin: Negative for color change.   Neurological: Negative for syncope.   Psychiatric/Behavioral: Negative for agitation.           PHYSICAL EXAM  ED Triage Vitals   Temp Heart Rate Resp BP SpO2   02/02/20 0936 02/02/20 0936 02/02/20 1009 02/02/20 1009 02/02/20 0936   96.3 °F (35.7 °C) 96 18 111/83 97 %      Temp src Heart Rate Source Patient Position BP Location FiO2 (%)   02/02/20 0936 02/02/20 0936 -- -- --   Tympanic Monitor        Physical Exam        LAB RESULTS  Recent Results (from the past 24 hour(s))   COMPREHENSIVE METABOLIC PANEL    Collection Time: 02/01/20 10:40 PM   Result Value Ref Range    Sodium 139 137 - 145 mmol/L    Potassium 3.7 3.5 - 5.1 mmol/L    Chloride 104 98 - 107 mmol/L    Total CO2 24 22 - 30 mmol/L    Glucose 83 74 - 99 mg/dL    BUN 8 7 - 20 mg/dL    Creatinine 0.6 (L) 0.7 - 1.5 mg/dL    BUN/Creatinine Ratio 13.3 RATIO    Est GFR by Clearance >60 >60 /1.73 m2    Total Protein 7.2 6.3 - 8.2 g/dL    Albumin 4.2 3.5 - 5.0 g/dL    Globulin 3.0 1.5 - 4.5 g/dL    A/G Ratio 1.4 1.1 - 2.5 RATIO    Calcium 9.1 8.4 - 10.2 mg/dL    Total Bilirubin 0.3 0.2 - 1.3 mg/dL    AST (SGOT) 20 15 - 46 U/L    ALT (SGPT) 14 0 - 35 U/L    Alkaline Phosphatase 91 38 - 126 U/L   CBC AND DIFFERENTIAL    Collection Time: 02/01/20 10:40 PM   Result Value Ref Range    WBC 14.83 (H) 4.5 - 11.0 10*3/uL    RBC 4.72 4.0 - 5.2 10*6/uL    Hemoglobin 13.0 12.0 - 16.0 g/dL    Hematocrit 40.7 36.0 - 46.0 %    MCV 86.2 80.0 - 100.0 fL    MCH 27.5 26.0 - 34.0 pg    MCHC 31.9 31.0 - 37.0 g/dL    RDW 15.9 12.0 - 16.8 %    Platelets 364 140 - 440 10*3/uL    MPV 10.0 6.7 - 10.8 fL    Differential  Type Hospital CBC w/AutoDiff (arb'U)    Neutrophil Rel % 76.0 45 - 80 %    Lymphocyte Rel % 14.4 (L) 15 - 50 %    Monocyte Rel % 5.7 0 - 15 %    Eosinophil % 3.2 0 - 7 %    Basophil Rel % 0.5 0 - 2 %    Immature Grans % 0.2 (H) 0 %    nRBC 0 0 /100(WBC)    Neutrophils Absolute 11.28 (H) 2.0 - 8.8 10*3/uL    Lymphocytes Absolute 2.14 0.7 - 5.5 10*3/uL    Monocytes Absolute 0.84 0.0 - 1.7 10*3/uL    Eosinophils Absolute 0.47 0.0 - 0.8 10*3/uL    Basophils Absolute 0.07 0.0 - 0.2 10*3/uL    Immature Grans, Absolute 0.03 <1 10*3/uL   Comprehensive Metabolic Panel    Collection Time: 02/02/20 10:54 AM   Result Value Ref Range    Glucose 91 65 - 99 mg/dL    BUN 9 6 - 20 mg/dL    Creatinine 0.62 0.57 - 1.00 mg/dL    Sodium 141 136 - 145 mmol/L    Potassium 3.6 3.5 - 5.2 mmol/L    Chloride 105 98 - 107 mmol/L    CO2 23.5 22.0 - 29.0 mmol/L    Calcium 8.2 (L) 8.6 - 10.5 mg/dL    Total Protein 5.6 (L) 6.0 - 8.5 g/dL    Albumin 3.50 3.50 - 5.20 g/dL    ALT (SGPT) 11 1 - 33 U/L    AST (SGOT) 14 1 - 32 U/L    Alkaline Phosphatase 85 39 - 117 U/L    Total Bilirubin 0.4 0.2 - 1.2 mg/dL    eGFR Non African Amer 123 >60 mL/min/1.73    Globulin 2.1 gm/dL    A/G Ratio 1.7 g/dL    BUN/Creatinine Ratio 14.5 7.0 - 25.0    Anion Gap 12.5 5.0 - 15.0 mmol/L   CBC Auto Differential    Collection Time: 02/02/20 10:54 AM   Result Value Ref Range    WBC 9.25 3.40 - 10.80 10*3/mm3    RBC 4.35 3.77 - 5.28 10*6/mm3    Hemoglobin 11.8 (L) 12.0 - 15.9 g/dL    Hematocrit 36.8 34.0 - 46.6 %    MCV 84.6 79.0 - 97.0 fL    MCH 27.1 26.6 - 33.0 pg    MCHC 32.1 31.5 - 35.7 g/dL    RDW 15.0 12.3 - 15.4 %    RDW-SD 46.3 37.0 - 54.0 fl    MPV 9.7 6.0 - 12.0 fL    Platelets 327 140 - 450 10*3/mm3    Neutrophil % 48.5 42.7 - 76.0 %    Lymphocyte % 29.4 19.6 - 45.3 %    Monocyte % 6.7 5.0 - 12.0 %    Eosinophil % 14.3 (H) 0.3 - 6.2 %    Basophil % 0.8 0.0 - 1.5 %    Immature Grans % 0.3 0.0 - 0.5 %    Neutrophils, Absolute 4.49 1.70 - 7.00 10*3/mm3     "Lymphocytes, Absolute 2.72 0.70 - 3.10 10*3/mm3    Monocytes, Absolute 0.62 0.10 - 0.90 10*3/mm3    Eosinophils, Absolute 1.32 (H) 0.00 - 0.40 10*3/mm3    Basophils, Absolute 0.07 0.00 - 0.20 10*3/mm3    Immature Grans, Absolute 0.03 0.00 - 0.05 10*3/mm3    nRBC 0.0 0.0 - 0.2 /100 WBC       I ordered the above labs and reviewed the results        RADIOLOGY  XR Chest 2 View   Final Result   No evidence for acute pulmonary process. Follow-up as   clinical indications persist.       This report was finalized on 2/2/2020 11:26 AM by Dr. Darion Frost M.D.                  MEDICATIONS GIVEN IN ER  Medications   ipratropium-albuterol (DUO-NEB) nebulizer solution 3 mL (3 mL Nebulization Given 2/2/20 1047)   methylPREDNISolone sodium succinate (SOLU-Medrol) injection 125 mg (125 mg Intravenous Given 2/2/20 1106)               PROCEDURES  Procedures          PROGRESS AND CONSULTS    Progress Notes:         Pt care turned over to Dr. Harp.    Reviewed pt's history and workup with   (ER physician). After a bedside evaluation,   agrees with the plan of care.          Disposition vitals:  /64   Pulse 78   Temp 96.3 °F (35.7 °C) (Tympanic)   Resp 18   Ht 162.6 cm (64\")   Wt 77.1 kg (170 lb)   LMP 01/16/2020   SpO2 95%   BMI 29.18 kg/m²           DIAGNOSIS  Final diagnoses:   Exacerbation of asthma, unspecified asthma severity, unspecified whether persistent           DISPOSITION  DISCHARGE    Patient discharged in stable condition.    Reviewed implications of results, diagnosis, meds, responsibility to follow up, warning signs and symptoms of possible worsening, potential complications and reasons to return to ER.    Patient/Family voiced understanding of above instructions.    Discussed plan for discharge, as there is no emergent indication for admission. Patient referred to primary care provider for BP management due to today's BP. Pt/family is agreeable and understands need for follow up and repeat " testing.  Pt is aware that discharge does not mean that nothing is wrong but it indicates no emergency is present that requires admission and they must continue care with follow-up as given below or physician of their choice.     FOLLOW-UP  [facility] Emergency Department  [address] [facility] [address] [phone]    As needed, If symptoms worsen    Your PCP    Schedule an appointment as soon as possible for a visit       PATIENT [facility] [address] [phone]  Call   to set up PCP if needed         Medication List      Changed    albuterol sulfate  (90 Base) MCG/ACT inhaler  Commonly known as:  PROVENTIL HFA;VENTOLIN HFA;PROAIR HFA  Inhale 2 puffs Every 4 (Four) Hours As Needed for Wheezing or Shortness of   Air.  What changed:  when to take this                       Cassidy Major PA-C  02/02/20 [id number]